# Patient Record
Sex: MALE | Race: WHITE | NOT HISPANIC OR LATINO | Employment: FULL TIME | URBAN - METROPOLITAN AREA
[De-identification: names, ages, dates, MRNs, and addresses within clinical notes are randomized per-mention and may not be internally consistent; named-entity substitution may affect disease eponyms.]

---

## 2021-09-02 ENCOUNTER — HOSPITAL ENCOUNTER (EMERGENCY)
Facility: HOSPITAL | Age: 41
Discharge: HOME | End: 2021-09-02
Attending: EMERGENCY MEDICINE | Admitting: EMERGENCY MEDICINE

## 2021-09-02 ENCOUNTER — APPOINTMENT (OUTPATIENT)
Dept: GENERAL RADIOLOGY | Facility: HOSPITAL | Age: 41
End: 2021-09-02

## 2021-09-02 VITALS
BODY MASS INDEX: 23.75 KG/M2 | SYSTOLIC BLOOD PRESSURE: 150 MMHG | HEART RATE: 80 BPM | HEIGHT: 76 IN | OXYGEN SATURATION: 98 % | TEMPERATURE: 99.2 F | RESPIRATION RATE: 18 BRPM | DIASTOLIC BLOOD PRESSURE: 90 MMHG | WEIGHT: 195 LBS

## 2021-09-02 DIAGNOSIS — S42.002A CLOSED DISPLACED FRACTURE OF LEFT CLAVICLE, UNSPECIFIED PART OF CLAVICLE, INITIAL ENCOUNTER: Primary | ICD-10-CM

## 2021-09-02 PROCEDURE — 99283 EMERGENCY DEPT VISIT LOW MDM: CPT

## 2021-09-02 PROCEDURE — 73000 X-RAY EXAM OF COLLAR BONE: CPT

## 2021-09-02 RX ORDER — LISINOPRIL 20 MG/1
30 TABLET ORAL DAILY
COMMUNITY

## 2021-09-02 RX ORDER — OXYCODONE HYDROCHLORIDE AND ACETAMINOPHEN 5; 325 MG/1; MG/1
1 TABLET ORAL EVERY 6 HOURS PRN
Qty: 12 TABLET | Refills: 0 | Status: SHIPPED | OUTPATIENT
Start: 2021-09-02

## 2021-09-02 RX ORDER — OXYCODONE HYDROCHLORIDE AND ACETAMINOPHEN 5; 325 MG/1; MG/1
1 TABLET ORAL ONCE
Status: COMPLETED | OUTPATIENT
Start: 2021-09-02 | End: 2021-09-02

## 2021-09-02 RX ADMIN — OXYCODONE AND ACETAMINOPHEN 1 TABLET: 5; 325 TABLET ORAL at 21:45

## 2021-09-03 ENCOUNTER — TELEPHONE (OUTPATIENT)
Dept: ORTHOPEDIC SURGERY | Facility: CLINIC | Age: 41
End: 2021-09-03

## 2021-09-03 NOTE — ED PROVIDER NOTES
EMERGENCY DEPARTMENT ENCOUNTER      Room Number: 13/13    History is provided by the patient, no translation services needed    HPI:    Chief complaint: Clavicle pain    Location: Left    Quality/Severity: Aching/ 4 out of 10    Timing/Duration: Just prior to arrival/constant    Modifying Factors: Worse with movement and palpation    Associated Symptoms: Swelling    Narrative: Pt is a 41 y.o. male who presents complaining of left clavicular pain.  Patient states that he was playing soccer in the backyard with his kids and he fell and slipped and injured himself.  Patient denies any loss of consciousness.  Patient denies any other injuries.      PMD: Provider, No Known    REVIEW OF SYSTEMS  Review of Systems   Musculoskeletal: Positive for arthralgias.   Skin: Negative for color change, pallor, rash and wound.         PAST MEDICAL HISTORY  Active Ambulatory Problems     Diagnosis Date Noted   • No Active Ambulatory Problems     Resolved Ambulatory Problems     Diagnosis Date Noted   • No Resolved Ambulatory Problems     Past Medical History:   Diagnosis Date   • Hypertension        PAST SURGICAL HISTORY  History reviewed. No pertinent surgical history.    FAMILY HISTORY  History reviewed. No pertinent family history.    SOCIAL HISTORY  Social History     Socioeconomic History   • Marital status:      Spouse name: Not on file   • Number of children: Not on file   • Years of education: Not on file   • Highest education level: Not on file   Tobacco Use   • Smoking status: Never Smoker   Substance and Sexual Activity   • Alcohol use: Yes   • Drug use: Never       ALLERGIES  Penicillins    No current facility-administered medications for this encounter.    Current Outpatient Medications:   •  lisinopril (PRINIVIL,ZESTRIL) 20 MG tablet, Take 30 mg by mouth Daily., Disp: , Rfl:   •  oxyCODONE-acetaminophen (PERCOCET) 5-325 MG per tablet, Take 1 tablet by mouth Every 6 (Six) Hours As Needed for Moderate Pain  or  Severe Pain  for up to 12 doses., Disp: 12 tablet, Rfl: 0    PHYSICAL EXAM  ED Triage Vitals [09/02/21 2042]   Temp Heart Rate Resp BP SpO2   99.2 °F (37.3 °C) 77 16 151/95 98 %      Temp src Heart Rate Source Patient Position BP Location FiO2 (%)   Oral -- -- -- --       Physical Exam  Vitals and nursing note reviewed.   HENT:      Head: Normocephalic and atraumatic.   Eyes:      Conjunctiva/sclera: Conjunctivae normal.   Cardiovascular:      Rate and Rhythm: Normal rate and regular rhythm.      Heart sounds: Normal heart sounds.   Pulmonary:      Effort: Pulmonary effort is normal. No respiratory distress.      Breath sounds: Normal breath sounds.   Chest:      Chest wall: Deformity, swelling and tenderness present.       Abdominal:      General: Bowel sounds are normal. There is no distension.      Palpations: Abdomen is soft.      Tenderness: There is no abdominal tenderness.   Musculoskeletal:         General: Normal range of motion.      Left hand: Normal pulse.        Arms:       Cervical back: Normal range of motion and neck supple.   Skin:     General: Skin is warm and dry.   Neurological:      Mental Status: He is alert and oriented to person, place, and time.   Psychiatric:         Mood and Affect: Mood and affect normal.           LAB RESULTS  Lab Results (last 24 hours)     ** No results found for the last 24 hours. **                RADIOLOGY  XR Clavicle Left    Result Date: 9/2/2021  CR Clavicle LT INDICATION: Acute left clavicle pain. Trauma COMPARISON: None available. FINDINGS: 2 views of the left clavicle. There is a mid shaft left clavicular fracture. Acromioclavicular and glenohumeral joint appear within normal limits. No pneumothorax. No bone erosion or destruction.  No foreign body.     There is a mid shaft left clavicular fracture. Signer Name: Pinky Aguilar MD  Signed: 9/2/2021 9:14 PM  Workstation Name: [id number]  [facility]      I ordered the above radiologic testing  and reviewed the results    PROCEDURES  Procedures      PROGRESS AND CONSULTS  ED Course as of Sep 02 2208   Thu Sep 02, 2021   2207 41-year-old male presents the ED with complaints of left clavicular pain sustained just prior to arrival.  Patient states that he was playing soccer and fell.  After history physical exam patient was noted to have a deformity and tenderness to his left clavicle.  Patient was noted to have no skin tenting.  Patient's is neuro vastly intact distally.  X-ray was ordered to rule out dislocation or fracture.  X-ray showed a clavicular fracture that is displaced.  I discussed results with patient.  Discussed with patient that he would need to be placed in a shoulder immobilizer.  Instructed patient if he any symptoms that worsen that he would need to return to the ED.  Also gave patient information to follow-up with orthopedics.  Patient expressed verbal understanding of plan of care.    [GT]      ED Course User Index  [GT] Janet Swanson, ILIANA           MEDICAL DECISION MAKING    MDM       DIAGNOSIS  Final diagnoses:   Closed displaced fracture of left clavicle, unspecified part of clavicle, initial encounter       Latest Documented Vital Signs:  As of 22:08 EDT  BP- 150/90 HR- 80 Temp- 99.2 °F (37.3 °C) (Oral) O2 sat- 98%    DISPOSITION  Discharged home        Discussed pertinent findings with the patient/family.  Patient/Family voiced understanding of need to follow-up for recheck and further testing as needed.  Return to the Emergency Department warnings were given.         Medication List      New Prescriptions    oxyCODONE-acetaminophen 5-325 MG per tablet  Commonly known as: PERCOCET  Take 1 tablet by mouth Every 6 (Six) Hours As Needed for Moderate Pain  or Severe Pain  for up to 12 doses.           Where to Get Your Medications      These medications were sent to [facility]/pharmacy #[id number] - [place], [place] - [address] [address] AT [facility] [address] - [phone]  -  [phone] FX  [address] [address]    Phone: [phone]   · oxyCODONE-acetaminophen 5-325 MG per tablet             Follow-up Information     Abdulkadir Gibbs MD. Call in 1 day.    Specialty: Orthopedic Surgery  Why: To schedule a follow up appointment  Contact information:  [address] [facility] [address]  [facility] [address] [phone]                     Dictated utilizing Dragon dictation     Janet Swanson PAStanleyC  09/02/21 2209

## 2021-09-03 NOTE — TELEPHONE ENCOUNTER
Please see the attached My Chart message from the patient's wife.         Hi Dr. Monson. My  Louie, fractured his left clavicle last night. We went to the ER last night, and they took an Xray and sent him home in a sling and a Percocet prescription. They said to call Dr. Ríos in the morning. When I spoke with the office this morning, he looked at the Xray and wanted Louie to be seen by you. But that's not until 1:40pm on Tuesday, Sept. 7th. Because that is such a long time, what do you recommend he does in the meantime? Ice, percocet/Ibuprofen for pain, sleep upright?? Thanks for your help.   - Ana Aviles